# Patient Record
Sex: MALE | Race: WHITE | NOT HISPANIC OR LATINO | Employment: STUDENT | URBAN - METROPOLITAN AREA
[De-identification: names, ages, dates, MRNs, and addresses within clinical notes are randomized per-mention and may not be internally consistent; named-entity substitution may affect disease eponyms.]

---
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
## 2023-09-01 ENCOUNTER — OFFICE VISIT (OUTPATIENT)
Dept: INTERNAL MEDICINE | Facility: CLINIC | Age: 18
End: 2023-09-01
Payer: COMMERCIAL

## 2023-09-01 VITALS
BODY MASS INDEX: 25 KG/M2 | TEMPERATURE: 97.1 F | OXYGEN SATURATION: 98 % | SYSTOLIC BLOOD PRESSURE: 102 MMHG | DIASTOLIC BLOOD PRESSURE: 60 MMHG | HEART RATE: 53 BPM | WEIGHT: 174.2 LBS

## 2023-09-01 DIAGNOSIS — B07.9 WARTS OF FOOT: ICD-10-CM

## 2023-09-01 DIAGNOSIS — J45.990 ASTHMA, EXERCISE INDUCED: ICD-10-CM

## 2023-09-01 DIAGNOSIS — R41.840 ATTENTION DEFICIT: ICD-10-CM

## 2023-09-01 DIAGNOSIS — Z72.0 NICOTINE VAPOR PRODUCT USER: ICD-10-CM

## 2023-09-01 DIAGNOSIS — Z00.00 ANNUAL PHYSICAL EXAM: Primary | ICD-10-CM

## 2023-09-01 DIAGNOSIS — R59.1 LYMPHADENOPATHY OF HEAD AND NECK: ICD-10-CM

## 2023-09-01 DIAGNOSIS — Z86.59 HISTORY OF MOOD DISORDER: ICD-10-CM

## 2023-09-01 PROBLEM — L70.0 ACNE VULGARIS: Status: RESOLVED | Noted: 2020-11-09 | Resolved: 2023-09-01

## 2024-05-24 ENCOUNTER — OFFICE VISIT (OUTPATIENT)
Dept: INTERNAL MEDICINE | Facility: CLINIC | Age: 19
End: 2024-05-24
Payer: COMMERCIAL

## 2024-05-24 VITALS
BODY MASS INDEX: 24.71 KG/M2 | HEART RATE: 76 BPM | SYSTOLIC BLOOD PRESSURE: 110 MMHG | TEMPERATURE: 97.7 F | HEIGHT: 70 IN | WEIGHT: 172.6 LBS | DIASTOLIC BLOOD PRESSURE: 60 MMHG | OXYGEN SATURATION: 98 %

## 2024-05-24 DIAGNOSIS — K13.0 MUCOCELE OF LOWER LIP: Primary | ICD-10-CM

## 2024-05-24 PROCEDURE — 99213 OFFICE O/P EST LOW 20 MIN: CPT | Performed by: FAMILY MEDICINE

## 2024-05-24 NOTE — PROGRESS NOTES
Date of Encounter: 2024  Patient: Scotty Granger,  2005    Subjective   History of Presenting Illness  Chief complaint: Mouth lesion    Over 2 months of a lesion on his lower lip.  It is not painful, but has been growing in size.  Maybe over the past week it is slightly smaller.  He has not been using any medications on this.  He has never had something like this before.  He has not had any oral HSV outbreaks. He does not have any other symptoms.  He does use a nicotine vaporizer.    The following portions of the patient's history were reviewed and updated as appropriate: allergies, current medications, past family history, past medical history, past social history, past surgical history and problem list.    Patient Active Problem List   Diagnosis    Asthma, exercise induced    History of mood disorder    Attention deficit    Warts of foot    Lymphadenopathy of head and neck since childhood    Nicotine vapor product user    Mucocele of lower lip     Past Medical History:   Diagnosis Date    Acne     Acne vulgaris 2020     Past Surgical History:   Procedure Laterality Date    CIRCUMCISION      TONSILLECTOMY       Family History   Problem Relation Age of Onset    Anxiety disorder Mother     No Known Problems Father     Brain cancer Maternal Aunt     Cancer Maternal Aunt         neuroblastoma    Hodgkin's lymphoma Paternal Aunt     Pancreatic cancer Maternal Grandmother     Anxiety disorder Maternal Grandmother     Glaucoma Maternal Grandmother     Cancer Maternal Grandmother         pancreatic    Vision loss Maternal Grandmother         Glaucoma    Heart attack Maternal Grandfather     Leukemia Paternal Grandfather     Cancer Paternal Grandfather         Leukemia    Alcohol abuse Paternal Aunt     Liver disease Paternal Aunt     Arthritis Maternal Aunt         Psoriatic    Cancer Paternal Aunt         Lung & Lymphoma    Heart disease Paternal Aunt        Current Outpatient Medications:      "ASHWAGANDHA PO, Take  by mouth., Disp: , Rfl:   No Known Allergies  Social History     Tobacco Use    Smoking status: Never    Smokeless tobacco: Never    Tobacco comments:     Vape   Substance Use Topics    Alcohol use: Yes     Alcohol/week: 0.0 standard drinks of alcohol    Drug use: Never          Objective   Physical Exam  Vitals:    05/24/24 1514   BP: 110/60   BP Location: Left arm   Patient Position: Sitting   Cuff Size: Large Adult   Pulse: 76   Temp: 97.7 °F (36.5 °C)   TempSrc: Infrared   SpO2: 98%   Weight: 78.3 kg (172 lb 9.6 oz)   Height: 177.8 cm (70\")     Body mass index is 24.77 kg/m².    Constitutional: NAD.  Eyes: Normal conjunctiva.  Ear, nose, mouth, throat: There is an 8 mm mucocele, nontender, on the right lower lip. no other oral lesions. No cervical or submental lymphadenopathy.  Psychiatric: Normal affect. Normal thought content.     Assessment & Plan   Assessment and Plan  Pleasant 18-year-old male with exercise-induced asthma, ADHD, history of mood disorder, nicotine vaporizer user, who presents with the following:     Diagnoses and all orders for this visit:    1. Mucocele of lower lip (Primary)  -     Ambulatory Referral to ENT (Otolaryngology)    Discussed management of mucocele.  I am going to refer him to ENT for surgical removal.  May be decreasing in size over the past week so if it continues to reduce in size I do not recommend surgical intervention, but it is fairly large and has been present for over 2 months so I think this is going to need removal.  I discussed the procedure with him and reasons to return for further evaluation.    BMI is within normal parameters. No other follow-up for BMI required.    Joni Nichols MD  Family Medicine  O: [phone]    Disclaimer: Parts of this note were dictated by speech recognition. Minor errors in transcription may be present. Please call if questions.     "

## 2024-05-31 ENCOUNTER — PATIENT ROUNDING (BHMG ONLY) (OUTPATIENT)
Dept: INTERNAL MEDICINE | Facility: CLINIC | Age: 19
End: 2024-05-31
Payer: COMMERCIAL

## 2024-05-31 NOTE — PROGRESS NOTES
A My-Chart message has been sent to the patient for Patient Rounding with [facility] [place].

## 2024-09-06 ENCOUNTER — OFFICE VISIT (OUTPATIENT)
Dept: INTERNAL MEDICINE | Facility: CLINIC | Age: 19
End: 2024-09-06
Payer: COMMERCIAL

## 2024-09-06 VITALS
WEIGHT: 175 LBS | HEIGHT: 70 IN | BODY MASS INDEX: 25.05 KG/M2 | SYSTOLIC BLOOD PRESSURE: 110 MMHG | TEMPERATURE: 97.6 F | HEART RATE: 50 BPM | OXYGEN SATURATION: 99 % | DIASTOLIC BLOOD PRESSURE: 72 MMHG

## 2024-09-06 DIAGNOSIS — J45.990 ASTHMA, EXERCISE INDUCED: ICD-10-CM

## 2024-09-06 DIAGNOSIS — R41.840 ATTENTION DEFICIT: ICD-10-CM

## 2024-09-06 DIAGNOSIS — K13.0 MUCOCELE OF LOWER LIP: ICD-10-CM

## 2024-09-06 DIAGNOSIS — Z00.00 ANNUAL PHYSICAL EXAM: Primary | ICD-10-CM

## 2024-09-06 DIAGNOSIS — Z72.0 NICOTINE VAPOR PRODUCT USER: ICD-10-CM

## 2024-09-06 DIAGNOSIS — Z78.9 DAILY CONSUMPTION OF ALCOHOL: ICD-10-CM

## 2024-09-06 DIAGNOSIS — Z86.59 HISTORY OF MOOD DISORDER: ICD-10-CM

## 2024-09-06 PROBLEM — R59.1 LYMPHADENOPATHY OF HEAD AND NECK: Status: RESOLVED | Noted: 2022-08-26 | Resolved: 2024-09-06

## 2024-09-06 PROBLEM — B07.9 WARTS OF FOOT: Status: RESOLVED | Noted: 2022-08-26 | Resolved: 2024-09-06

## 2024-09-06 PROCEDURE — 99395 PREV VISIT EST AGE 18-39: CPT | Performed by: FAMILY MEDICINE

## 2024-09-06 NOTE — PROGRESS NOTES
Date of Encounter: 2024  Patient: Scotty Granger,  2005    Subjective   History of Presenting Illness  Chief complaint: Annual physical     No acute concerns.    Exercise-induced asthma with no recent exacerbations.  He continues to smoke nicotine vaporizer's daily, and uses cannabis by vaporizer at least once monthly.  No recent bronchitis.  No need for inhaler use.    Attention deficit disorder, history of mood disorder, no current mood concerns and no attention related functional impairments.    He is drinking alcohol daily for the past month.  Up to 8 beers every other day, or for mixed drinks like [place] ice tea.  No eye-opener.  No withdrawal symptoms.  He has had some heartburn with this.  There is a second-degree family member with alcohol use disorder.  He does not use any injectable drugs, no opioids.  Feels like he is drinking due to boredom as he is between jobs.  Does not drive intoxicated.     Lymphadenopathy of the head and neck since childhood, remained stable with no changes.  Posterior left neck.    Mucocele that we discussed earlier this year has decreased in size and does not currently symptomatic.  He feels it was related to Zyn pouch use.    Lives with parents.  Not currently sexually active and declines STI testing.  Uses nicotine vaporizer daily, cannabis vaporizer monthly, alcohol use as above. Parents cook fairly healthy. Lifting 2-3 times per week, no recent injuries. Not currently working, considering a trade job. Discussed influenza, COVID-19 vaccine.    The following portions of the patient's history were reviewed and updated as appropriate: allergies, current medications, past family history, past medical history, past social history, past surgical history and problem list.    Patient Active Problem List   Diagnosis    Asthma, exercise induced    History of mood disorder    Attention deficit    Nicotine vapor product user    Mucocele of lower lip    Daily  "consumption of alcohol     Past Medical History:   Diagnosis Date    Acne     Acne vulgaris 11/09/2020    Lymphadenopathy of head and neck since childhood 08/26/2022    Warts of foot 08/26/2022     Past Surgical History:   Procedure Laterality Date    CIRCUMCISION      TONSILLECTOMY       Family History   Problem Relation Age of Onset    Anxiety disorder Mother     No Known Problems Father     Brain cancer Maternal Aunt     Cancer Maternal Aunt         neuroblastoma    Hodgkin's lymphoma Paternal Aunt     Pancreatic cancer Maternal Grandmother     Anxiety disorder Maternal Grandmother     Glaucoma Maternal Grandmother     Cancer Maternal Grandmother         pancreatic    Vision loss Maternal Grandmother         Glaucoma    Heart attack Maternal Grandfather     Leukemia Paternal Grandfather     Cancer Paternal Grandfather         Leukemia    Alcohol abuse Paternal Aunt     Liver disease Paternal Aunt     Arthritis Maternal Aunt         Psoriatic    Cancer Paternal Aunt         Lung & Lymphoma    Heart disease Paternal Aunt        Current Outpatient Medications:     ASHWAGANDHA PO, Take  by mouth., Disp: , Rfl:   No Known Allergies  Social History     Tobacco Use    Smoking status: Never    Smokeless tobacco: Never    Tobacco comments:     Vape   Vaping Use    Vaping status: Every Day    Substances: Nicotine   Substance Use Topics    Alcohol use: Yes    Drug use: Never          Objective   Physical Exam  Vitals:    09/06/24 0904   BP: 110/72   BP Location: Left arm   Patient Position: Sitting   Pulse: 50   Temp: 97.6 °F (36.4 °C)   TempSrc: Infrared   SpO2: 99%   Weight: 79.4 kg (175 lb)   Height: 177.8 cm (70\")     Body mass index is 25.11 kg/m².    Constitutional: NAD.  Eyes: EOMI. PERRLA. Normal conjunctiva.  Ear, nose, mouth, throat: No tonsillar exudates or erythema.   Normal nasal mucosa. Normal external ear canals and TMs bilaterally.  Cardiovascular: RRR. No murmurs. No LE edema b/l. Radial pulses 2+ " bilaterally.  Pulmonary: CTA b/l. Good effort.  Integumentary: No rashes or wounds on face or upper extremities.  Lymphatic: No anterior cervical lymphadenopathy.  Endocrine: No thyromegaly or palpable thyroid nodules.  Psychiatric: Normal affect. Normal thought content.  Gastrointestinal: Nondistended. No hepatosplenomegaly. No focal tenderness to palpation. Normal bowel sounds.     Assessment & Plan   Assessment and Plan  Pleasant 19-year-old male with exercise-induced asthma, ADHD with history of mood disorder, daily nicotine use, daily alcohol use, who presents for the following:    Diagnoses and all orders for this visit:    1. Annual physical exam (Primary): We discussed preventative care including age and patient-appropriate immunizations and cancer screening. We also discussed the importance of exercise and a healthy diet. This is their annual preventative exam.    2. Asthma, exercise induced: No recent symptoms despite nicotine vaporizer use    3. Attention deficit: No major functional impairments or symptoms    4. History of mood disorder: No current mood concerns    5. Nicotine vapor product user: Discussed concerns with long-term use in context of his asthma but discussed nicotine replacement and smoking cessation medications when interested    6. Daily consumption of alcohol: Discussed this at length.  Encouraged him to cut down as this is a high daily alcohol use and prolonged drinking at this range will cause dependence.  No evidence of alcohol use disorder at this time, not a strong family history.  Discussed reasons to follow-up for further evaluation.    7. Mucocele of lower lip: Resolving, continue observation    BMI is >= 25 and <30. (Overweight) The following options were offered after discussion;: information on healthy weight added to patient's after visit summary     Return to office in 1 year for annual physical or earlier as needed.    Joni Nichols MD  Family Medicine  O:  [phone]    Disclaimer: Parts of this note were dictated by speech recognition. Minor errors in transcription may be present. Please call if questions.